# Patient Record
Sex: MALE | Race: WHITE | NOT HISPANIC OR LATINO | Employment: UNEMPLOYED | URBAN - METROPOLITAN AREA
[De-identification: names, ages, dates, MRNs, and addresses within clinical notes are randomized per-mention and may not be internally consistent; named-entity substitution may affect disease eponyms.]

---

## 2021-04-09 ENCOUNTER — HOSPITAL ENCOUNTER (OUTPATIENT)
Dept: VACCINE CLINIC | Facility: HOSPITAL | Age: 26
Discharge: HOME | End: 2021-04-09
Attending: INTERNAL MEDICINE

## 2022-06-21 PROCEDURE — U0004 COV-19 TEST NON-CDC HGH THRU: HCPCS | Performed by: NURSE PRACTITIONER

## 2022-09-30 ENCOUNTER — PATIENT ROUNDING (BHMG ONLY) (OUTPATIENT)
Dept: FAMILY MEDICINE CLINIC | Facility: CLINIC | Age: 27
End: 2022-09-30

## 2022-09-30 ENCOUNTER — OFFICE VISIT (OUTPATIENT)
Dept: FAMILY MEDICINE CLINIC | Facility: CLINIC | Age: 27
End: 2022-09-30

## 2022-09-30 VITALS
BODY MASS INDEX: 17.07 KG/M2 | SYSTOLIC BLOOD PRESSURE: 124 MMHG | DIASTOLIC BLOOD PRESSURE: 86 MMHG | HEIGHT: 74 IN | WEIGHT: 133 LBS | HEART RATE: 68 BPM | OXYGEN SATURATION: 98 %

## 2022-09-30 DIAGNOSIS — R52 BODY ACHES: ICD-10-CM

## 2022-09-30 DIAGNOSIS — F41.8 DEPRESSION WITH ANXIETY: ICD-10-CM

## 2022-09-30 DIAGNOSIS — J02.9 SORE THROAT: Primary | ICD-10-CM

## 2022-09-30 DIAGNOSIS — R09.82 PND (POST-NASAL DRIP): ICD-10-CM

## 2022-09-30 PROBLEM — F32.A DEPRESSION: Status: ACTIVE | Noted: 2022-09-30

## 2022-09-30 PROBLEM — F41.9 ANXIETY: Status: ACTIVE | Noted: 2022-09-30

## 2022-09-30 PROBLEM — F19.10 DRUG ABUSE: Status: ACTIVE | Noted: 2022-09-30

## 2022-09-30 PROBLEM — B19.20 HEPATITIS C: Status: ACTIVE | Noted: 2022-09-30

## 2022-09-30 LAB
EXPIRATION DATE: NORMAL
EXPIRATION DATE: NORMAL
FLUAV AG UPPER RESP QL IA.RAPID: NOT DETECTED
FLUBV AG UPPER RESP QL IA.RAPID: NOT DETECTED
INTERNAL CONTROL: NORMAL
INTERNAL CONTROL: NORMAL
Lab: NORMAL
Lab: NORMAL
S PYO AG THROAT QL: NEGATIVE
SARS-COV-2 AG UPPER RESP QL IA.RAPID: NOT DETECTED

## 2022-09-30 PROCEDURE — 99213 OFFICE O/P EST LOW 20 MIN: CPT | Performed by: NURSE PRACTITIONER

## 2022-09-30 PROCEDURE — 87880 STREP A ASSAY W/OPTIC: CPT | Performed by: NURSE PRACTITIONER

## 2022-09-30 PROCEDURE — 87428 SARSCOV & INF VIR A&B AG IA: CPT | Performed by: NURSE PRACTITIONER

## 2022-09-30 RX ORDER — BUPRENORPHINE 300 MG/1
SOLUTION SUBCUTANEOUS
COMMUNITY
Start: 2022-08-18

## 2022-09-30 RX ORDER — BUPROPION HYDROCHLORIDE 300 MG/1
300 TABLET ORAL DAILY
COMMUNITY

## 2022-09-30 RX ORDER — LORATADINE 10 MG/1
10 TABLET ORAL DAILY
Qty: 90 TABLET | Refills: 1 | Status: SHIPPED | OUTPATIENT
Start: 2022-09-30

## 2023-11-02 ENCOUNTER — LAB (OUTPATIENT)
Dept: LAB | Facility: HOSPITAL | Age: 28
End: 2023-11-02
Payer: COMMERCIAL

## 2023-11-02 ENCOUNTER — OFFICE VISIT (OUTPATIENT)
Dept: FAMILY MEDICINE CLINIC | Facility: CLINIC | Age: 28
End: 2023-11-02
Payer: COMMERCIAL

## 2023-11-02 VITALS
TEMPERATURE: 98.4 F | HEART RATE: 88 BPM | BODY MASS INDEX: 15.94 KG/M2 | SYSTOLIC BLOOD PRESSURE: 112 MMHG | WEIGHT: 124.2 LBS | DIASTOLIC BLOOD PRESSURE: 76 MMHG | OXYGEN SATURATION: 99 % | HEIGHT: 74 IN

## 2023-11-02 DIAGNOSIS — F19.10 DRUG ABUSE, IV: ICD-10-CM

## 2023-11-02 DIAGNOSIS — R10.811 RIGHT UPPER QUADRANT ABDOMINAL TENDERNESS WITHOUT REBOUND TENDERNESS: ICD-10-CM

## 2023-11-02 DIAGNOSIS — N48.9 LESION OF PENIS: ICD-10-CM

## 2023-11-02 DIAGNOSIS — Z01.84 IMMUNITY STATUS TESTING: ICD-10-CM

## 2023-11-02 DIAGNOSIS — R63.4 UNINTENDED WEIGHT LOSS: Primary | ICD-10-CM

## 2023-11-02 DIAGNOSIS — Z86.19 HISTORY OF HEPATITIS C: ICD-10-CM

## 2023-11-02 DIAGNOSIS — M25.50 MULTIPLE JOINT PAIN: ICD-10-CM

## 2023-11-02 DIAGNOSIS — Z00.00 HEALTHCARE MAINTENANCE: ICD-10-CM

## 2023-11-02 DIAGNOSIS — E55.9 VITAMIN D DEFICIENCY: ICD-10-CM

## 2023-11-02 LAB
25(OH)D3 SERPL-MCNC: 39.7 NG/ML (ref 30–100)
ALPHA-FETOPROTEIN: 3.61 NG/ML (ref 0–8.3)
BASOPHILS # BLD AUTO: 0.02 10*3/MM3 (ref 0–0.2)
BASOPHILS NFR BLD AUTO: 0.5 % (ref 0–1.5)
DEPRECATED RDW RBC AUTO: 40 FL (ref 37–54)
EOSINOPHIL # BLD AUTO: 0.22 10*3/MM3 (ref 0–0.4)
EOSINOPHIL NFR BLD AUTO: 5 % (ref 0.3–6.2)
ERYTHROCYTE [DISTWIDTH] IN BLOOD BY AUTOMATED COUNT: 12.3 % (ref 12.3–15.4)
HBV SURFACE AB SER RIA-ACNC: REACTIVE
HCT VFR BLD AUTO: 46.1 % (ref 37.5–51)
HGB BLD-MCNC: 15.8 G/DL (ref 13–17.7)
HIV 1+2 AB+HIV1 P24 AG SERPL QL IA: NORMAL
IMM GRANULOCYTES # BLD AUTO: 0.01 10*3/MM3 (ref 0–0.05)
IMM GRANULOCYTES NFR BLD AUTO: 0.2 % (ref 0–0.5)
LYMPHOCYTES # BLD AUTO: 1.37 10*3/MM3 (ref 0.7–3.1)
LYMPHOCYTES NFR BLD AUTO: 31.1 % (ref 19.6–45.3)
MCH RBC QN AUTO: 30.3 PG (ref 26.6–33)
MCHC RBC AUTO-ENTMCNC: 34.3 G/DL (ref 31.5–35.7)
MCV RBC AUTO: 88.3 FL (ref 79–97)
MONOCYTES # BLD AUTO: 0.4 10*3/MM3 (ref 0.1–0.9)
MONOCYTES NFR BLD AUTO: 9.1 % (ref 5–12)
NEUTROPHILS NFR BLD AUTO: 2.39 10*3/MM3 (ref 1.7–7)
NEUTROPHILS NFR BLD AUTO: 54.1 % (ref 42.7–76)
NRBC BLD AUTO-RTO: 0 /100 WBC (ref 0–0.2)
PLATELET # BLD AUTO: 212 10*3/MM3 (ref 140–450)
PMV BLD AUTO: 10.8 FL (ref 6–12)
RBC # BLD AUTO: 5.22 10*6/MM3 (ref 4.14–5.8)
WBC NRBC COR # BLD: 4.41 10*3/MM3 (ref 3.4–10.8)

## 2023-11-02 PROCEDURE — 86706 HEP B SURFACE ANTIBODY: CPT | Performed by: NURSE PRACTITIONER

## 2023-11-02 PROCEDURE — 86038 ANTINUCLEAR ANTIBODIES: CPT | Performed by: NURSE PRACTITIONER

## 2023-11-02 PROCEDURE — 86140 C-REACTIVE PROTEIN: CPT | Performed by: NURSE PRACTITIONER

## 2023-11-02 PROCEDURE — 80050 GENERAL HEALTH PANEL: CPT | Performed by: NURSE PRACTITIONER

## 2023-11-02 PROCEDURE — 80061 LIPID PANEL: CPT | Performed by: NURSE PRACTITIONER

## 2023-11-02 PROCEDURE — 99214 OFFICE O/P EST MOD 30 MIN: CPT | Performed by: NURSE PRACTITIONER

## 2023-11-02 PROCEDURE — G0432 EIA HIV-1/HIV-2 SCREEN: HCPCS | Performed by: NURSE PRACTITIONER

## 2023-11-02 PROCEDURE — 82105 ALPHA-FETOPROTEIN SERUM: CPT | Performed by: NURSE PRACTITIONER

## 2023-11-02 PROCEDURE — 1159F MED LIST DOCD IN RCRD: CPT | Performed by: NURSE PRACTITIONER

## 2023-11-02 PROCEDURE — 83036 HEMOGLOBIN GLYCOSYLATED A1C: CPT | Performed by: NURSE PRACTITIONER

## 2023-11-02 PROCEDURE — 1160F RVW MEDS BY RX/DR IN RCRD: CPT | Performed by: NURSE PRACTITIONER

## 2023-11-02 PROCEDURE — 86200 CCP ANTIBODY: CPT | Performed by: NURSE PRACTITIONER

## 2023-11-02 PROCEDURE — 87522 HEPATITIS C REVRS TRNSCRPJ: CPT | Performed by: NURSE PRACTITIONER

## 2023-11-02 PROCEDURE — 82306 VITAMIN D 25 HYDROXY: CPT | Performed by: NURSE PRACTITIONER

## 2023-11-02 PROCEDURE — 36415 COLL VENOUS BLD VENIPUNCTURE: CPT | Performed by: NURSE PRACTITIONER

## 2023-11-02 PROCEDURE — 84134 ASSAY OF PREALBUMIN: CPT | Performed by: NURSE PRACTITIONER

## 2023-11-02 RX ORDER — LAMOTRIGINE 100 MG/1
100 TABLET ORAL DAILY
COMMUNITY

## 2023-11-02 NOTE — PROGRESS NOTES
Subjective   Jessica Rao is a 28 y.o. male.   Chief Complaint   Patient presents with    Joint Pain    Weight Loss     unintended       Joint Pain  Associated symptoms include abdominal pain (occ right upper abdominal pain), arthralgias (hands, neck, knees, elbows) and neck pain. Pertinent negatives include no chest pain, chills, coughing, fatigue, fever, headaches, nausea, rash or vomiting.   Weight Loss  Associated symptoms include abdominal pain (occ right upper abdominal pain), arthralgias (hands, neck, knees, elbows) and neck pain. Pertinent negatives include no chest pain, chills, coughing, fatigue, fever, headaches, nausea, rash or vomiting.      Patient is here with complaint of ongoing unintended weight loss. Patient sees therapist at [facility], is on Lamictal; averages 2 meals a day; eats snacks, rarely has sweets, does not think he should be this thin based on what he eats. He does walk a lot for his job    Has been treated for Hep C, last drug use 15 months ago  The following portions of the patient's history were reviewed and updated as appropriate: allergies, current medications, past family history, past medical history, past social history, past surgical history, and problem list.    Review of Systems   Constitutional:  Positive for unexpected weight change (weight loss) and weight loss. Negative for chills, fatigue and fever.   Eyes:  Negative for photophobia, redness and visual disturbance.   Respiratory:  Negative for cough, shortness of breath and wheezing.    Cardiovascular:  Negative for chest pain, palpitations and leg swelling.   Gastrointestinal:  Positive for abdominal pain (occ right upper abdominal pain). Negative for blood in stool, constipation, diarrhea, nausea and vomiting.   Endocrine: Positive for cold intolerance. Negative for heat intolerance, polydipsia and polyuria.   Genitourinary:  Negative for difficulty urinating and dysuria.   Musculoskeletal:  Positive for arthralgias  "(hands, neck, knees, elbows) and neck pain.   Skin:  Negative for color change, pallor, rash and wound.   Neurological:  Positive for light-headedness (occ with standing). Negative for tremors and headaches.   Psychiatric/Behavioral:  Negative for dysphoric mood, self-injury, sleep disturbance and suicidal ideas. The patient is nervous/anxious (controlled).        Objective   Physical Exam  Vitals reviewed.   Constitutional:       Appearance: Normal appearance. He is underweight.   HENT:      Head: Normocephalic and atraumatic.   Cardiovascular:      Rate and Rhythm: Normal rate and regular rhythm.      Heart sounds: Normal heart sounds.   Pulmonary:      Effort: No respiratory distress.      Breath sounds: Normal breath sounds.   Abdominal:      General: Abdomen is flat.      Palpations: Abdomen is soft.      Tenderness: There is no abdominal tenderness.   Genitourinary:     Comments: Round 0.2 cm raised lesion on penis  Musculoskeletal:      Comments: Negative Marfan's wrist and thumb signs   Neurological:      Mental Status: He is alert and oriented to person, place, and time.   Psychiatric:         Mood and Affect: Mood normal.         Behavior: Behavior normal.         Thought Content: Thought content normal.         Judgment: Judgment normal.       /76 (BP Location: Right arm, Patient Position: Sitting, Cuff Size: Adult)   Pulse 88   Temp 98.4 °F (36.9 °C) (Oral)   Ht 188 cm (74.02\")   Wt 56.3 kg (124 lb 3.2 oz)   SpO2 99%   BMI 15.94 kg/m²     Assessment & Plan   Problems Addressed this Visit    None  Visit Diagnoses       Unintended weight loss    -  Primary    Relevant Orders    Hemoglobin A1c (Completed)    HIV-1 / O / 2 Ag / Antibody (Completed)    Prealbumin (Completed)    US Liver    Immunity status testing        Relevant Orders    Hepatitis B Surface Antibody (Completed)    Healthcare maintenance        Relevant Orders    CBC Auto Differential (Completed)    Comprehensive Metabolic Panel " (Completed)    Lipid Panel (Completed)    TSH Rfx On Abnormal To Free T4 (Completed)    Vitamin D deficiency        Relevant Orders    Vitamin D,25-Hydroxy (Completed)    History of hepatitis C        Relevant Orders    AFP Tumor Marker (Completed)    Hepatitis C RNA, Quantitative, PCR (graph) (Completed)    US Liver    Multiple joint pain        Relevant Orders    C-reactive protein (Completed)    Cyclic Citrul Peptide Antibody, IgG / IgA (Completed)    SYD by IFA, Reflex 9-biomarkers profile    Drug abuse, IV        Relevant Orders    HIV-1 / O / 2 Ag / Antibody (Completed)    Lesion of penis        Relevant Orders    Ambulatory Referral to Dermatology (Completed)    Right upper quadrant abdominal tenderness without rebound tenderness        Relevant Orders    US Liver          Diagnoses         Codes Comments    Unintended weight loss    -  Primary ICD-10-CM: R63.4  ICD-9-CM: 783.21     Immunity status testing     ICD-10-CM: Z01.84  ICD-9-CM: V72.61     Healthcare maintenance     ICD-10-CM: Z00.00  ICD-9-CM: V70.0     Vitamin D deficiency     ICD-10-CM: E55.9  ICD-9-CM: 268.9     History of hepatitis C     ICD-10-CM: Z86.19  ICD-9-CM: V12.09     Multiple joint pain     ICD-10-CM: M25.50  ICD-9-CM: 719.49     Drug abuse, IV     ICD-10-CM: F19.10  ICD-9-CM: 305.90     Lesion of penis     ICD-10-CM: N48.9  ICD-9-CM: 607.89     Right upper quadrant abdominal tenderness without rebound tenderness     ICD-10-CM: R10.811  ICD-9-CM: 789.61             Screening labs ordered to evaluate chronic conditions. I will contact patient regarding test results and provide instructions regarding any necessary changes in plan of care.  Referred to dermatology for evaluation of lesion on penis  Patient was encouraged to keep me informed of any acute changes, lack of improvement, or any new concerning symptoms.  High protein high calorie diet information provided

## 2023-11-03 LAB
ALBUMIN SERPL-MCNC: 5 G/DL (ref 3.5–5.2)
ALBUMIN/GLOB SERPL: 1.7 G/DL
ALP SERPL-CCNC: 90 U/L (ref 39–117)
ALT SERPL W P-5'-P-CCNC: 17 U/L (ref 1–41)
ANION GAP SERPL CALCULATED.3IONS-SCNC: 9.6 MMOL/L (ref 5–15)
AST SERPL-CCNC: 21 U/L (ref 1–40)
BILIRUB SERPL-MCNC: 0.5 MG/DL (ref 0–1.2)
BUN SERPL-MCNC: 11 MG/DL (ref 6–20)
BUN/CREAT SERPL: 11.8 (ref 7–25)
CALCIUM SPEC-SCNC: 9.7 MG/DL (ref 8.6–10.5)
CHLORIDE SERPL-SCNC: 101 MMOL/L (ref 98–107)
CHOLEST SERPL-MCNC: 170 MG/DL (ref 0–200)
CO2 SERPL-SCNC: 28.4 MMOL/L (ref 22–29)
CREAT SERPL-MCNC: 0.93 MG/DL (ref 0.76–1.27)
CRP SERPL-MCNC: <0.3 MG/DL (ref 0–0.5)
EGFRCR SERPLBLD CKD-EPI 2021: 114.7 ML/MIN/1.73
GLOBULIN UR ELPH-MCNC: 2.9 GM/DL
GLUCOSE SERPL-MCNC: 78 MG/DL (ref 65–99)
HBA1C MFR BLD: 5 % (ref 4.8–5.6)
HDLC SERPL-MCNC: 50 MG/DL (ref 40–60)
LDLC SERPL CALC-MCNC: 106 MG/DL (ref 0–100)
LDLC/HDLC SERPL: 2.1 {RATIO}
POTASSIUM SERPL-SCNC: 4.5 MMOL/L (ref 3.5–5.2)
PREALB SERPL-MCNC: 28.2 MG/DL (ref 20–40)
PROT SERPL-MCNC: 7.9 G/DL (ref 6–8.5)
SODIUM SERPL-SCNC: 139 MMOL/L (ref 136–145)
TRIGL SERPL-MCNC: 76 MG/DL (ref 0–150)
TSH SERPL DL<=0.05 MIU/L-ACNC: 2.82 UIU/ML (ref 0.27–4.2)
VLDLC SERPL-MCNC: 14 MG/DL (ref 5–40)

## 2023-11-04 LAB — CCP IGA+IGG SERPL IA-ACNC: 3 UNITS (ref 0–19)

## 2023-11-06 ENCOUNTER — PATIENT MESSAGE (OUTPATIENT)
Dept: FAMILY MEDICINE CLINIC | Facility: CLINIC | Age: 28
End: 2023-11-06
Payer: COMMERCIAL

## 2023-11-06 LAB
HCV RNA SERPL NAA+PROBE-ACNC: NORMAL IU/ML
TEST INFORMATION: NORMAL

## 2023-11-06 NOTE — TELEPHONE ENCOUNTER
From: Jessica Rao  To: Hafsa Person  Sent: 11/6/2023 10:03 AM EST  Subject: Referrals     Where did you send my referral for the urologist to?

## 2023-11-07 LAB
ANA SER QL IF: NEGATIVE
LABORATORY COMMENT REPORT: NORMAL

## 2023-11-30 ENCOUNTER — OFFICE VISIT (OUTPATIENT)
Dept: FAMILY MEDICINE CLINIC | Facility: CLINIC | Age: 28
End: 2023-11-30
Payer: COMMERCIAL

## 2023-11-30 VITALS
SYSTOLIC BLOOD PRESSURE: 118 MMHG | WEIGHT: 124.4 LBS | HEIGHT: 74 IN | HEART RATE: 80 BPM | OXYGEN SATURATION: 99 % | DIASTOLIC BLOOD PRESSURE: 80 MMHG | BODY MASS INDEX: 15.97 KG/M2

## 2023-11-30 DIAGNOSIS — R29.898 TALL STATURE: ICD-10-CM

## 2023-11-30 DIAGNOSIS — R68.89 THIN BUILD: ICD-10-CM

## 2023-11-30 DIAGNOSIS — Z00.00 ANNUAL PHYSICAL EXAM: Primary | ICD-10-CM

## 2023-11-30 DIAGNOSIS — R63.6 UNDERWEIGHT ON EXAMINATION: ICD-10-CM

## 2023-11-30 NOTE — PROGRESS NOTES
Patient Care Team:  Hafsa Person APRN as PCP - General (Nurse Practitioner)     Chief Complaint   Patient presents with    Annual Exam       Chief complaint: Patient is in today for a physical     Patient is a 28 y.o. male who presents for his yearly physical exam.     HPI   Here for physical,Still sees therapist at [facility]; Lamictal working well. Has been doing push ups for strengthening. 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 Tried wellbutrin to try to quit vaping, made him more anxious so did not continue  Has not gained any weight, did not see letter with labs  Scheduled for liver US, hx of Hep C, negative PCR  Review of Systems   Constitutional:  Negative for chills, fatigue, fever and unexpected weight change.   HENT:  Negative for congestion, ear pain, hearing loss and trouble swallowing.    Eyes:  Negative for photophobia, redness and visual disturbance.   Respiratory:  Negative for cough, shortness of breath and wheezing.         Sternal discomfort when vaping   Cardiovascular:  Negative for chest pain, palpitations and leg swelling.   Gastrointestinal:  Negative for abdominal pain, blood in stool, constipation, diarrhea, nausea and vomiting.   Endocrine: Positive for cold intolerance. Negative for heat intolerance, polydipsia and polyuria.   Genitourinary:  Negative for difficulty urinating and dysuria.   Musculoskeletal:  Positive for arthralgias (hands, neck, knees, elbows; worse in cold weather) and neck pain.   Skin:  Negative for color change, pallor, rash and wound.   Neurological:  Positive for light-headedness (occ with standing if hasn't eaten). Negative for dizziness, tremors and headaches.   Psychiatric/Behavioral:  Negative for dysphoric mood, self-injury, sleep disturbance and suicidal ideas. The patient is nervous/anxious (controlled).       History  Past Medical History:   Diagnosis Date    Anxiety     Depression     Drug abuse     Hepatitis C     treated 2022      Past  Surgical History:   Procedure Laterality Date    WISDOM TOOTH EXTRACTION        No Known Allergies   Family History   Problem Relation Age of Onset    Depression Mother     Anxiety disorder Mother     Hypertension Father     Carpal tunnel syndrome Father     COPD Father     Drug abuse Father     Arthritis Father     Lupus Father     Leukemia Paternal Grandmother     Diabetes Paternal Grandfather     Dementia Paternal Grandfather      Social History     Socioeconomic History    Marital status: Single   Tobacco Use    Smoking status: Former     Packs/day: 0.50     Years: 1.00     Additional pack years: 0.00     Total pack years: 0.50     Types: Cigarettes     Start date: 9/10/2007     Quit date: 2022     Years since quittin.3     Passive exposure: Never    Smokeless tobacco: Never   Vaping Use    Vaping Use: Every day    Substances: Nicotine    Devices: Disposable   Substance and Sexual Activity    Alcohol use: Not Currently     Comment: last drink around     Drug use: Not Currently     Types: Heroin, Cocaine(coke), Methamphetamines, Marijuana     Comment: last use 22    Sexual activity: Yes     Partners: Female        Current Outpatient Medications:     lamoTRIgine (LaMICtal) 100 MG tablet, Take 2 tablets by mouth Daily., Disp: , Rfl:     Immunizations   N/A   Prescribed/Refused   Date     Td/Tdap  (Booster Q 10 yrs)   []           Prescribed    []     Refused        []           Flu  (Yearly)   []        Prescribed    []     Refused        []           Pneumonia      []        Prescribed    []     Refused        []                 Hep B     []        Prescribed    []     Refused        []           Shingles  (Age 50 and older)   []        Prescribed    []     Refused        []           Immunization History   Administered Date(s) Administered    COVID-19 ([employer]) 2021    DTaP, Unspecified 2000    Hep B, Adolescent or Pediatric 2000    Hepatitis A 2018, 2019    IPV  04/20/2000    MCV4 Unspecified 05/04/2010    MMR 04/20/2000    Meningococcal MCV4P ([employer]) 05/04/2010    Tdap 05/04/2010, 01/25/2014     Health Maintenance   Topic Date Due    BMI FOLLOWUP  Never done    Hepatitis B (2 of 3 - 3-dose series) 05/18/2000    ANNUAL PHYSICAL  Never done    COVID-19 Vaccine (2 - 2023-24 season) 09/01/2023    INFLUENZA VACCINE  03/31/2024 (Originally 8/1/2023)    TDAP/TD VACCINES (3 - Td or Tdap) 01/25/2024    HEPATITIS C SCREENING  Completed    Pneumococcal Vaccine 0-64  Aged Out        Diabetes  [] Yes  [] No   N/A      Date     Eye Exam     []             []   Complete     []   Recommended Date:  Where:       Foot Exam     []         []   Complete          Obesity Counseling     []       []   Complete       Hemoglobin A1C   Date Value Ref Range Status   11/02/2023 5.00 4.80 - 5.60 % Final       Additional Testing      Date     Colorectal Screening       []   N/A   []   Complete    []   Ordered     Date:    Where:       Pap      []   N/A   []   Complete   []   OB/GYN Date:    Where:       Mammogram        []   N/A   []   Complete   []  OB/GYN   []   Ordered Date:    Where:     PSA  (Over age 50)    []   N/A   []   Complete   []   Ordered Date:    Where:     US Aorta  (For male smokers, age 65)     []   N/A   []   Complete   []   Ordered Date:    Where:     CT for Smoker  (Age 55-75, 30 pk yr)    []   N/A   []   Complete   []   Ordered Date:    Where:     Bone Density/DEXA      []   N/A   []   Complete   []   Ordered Date:    Follow-up:     Hep. C        []   N/A   []   Complete   []   Ordered Date:    Where:     Results for orders placed or performed in visit on 11/02/23   CBC Auto Differential    Specimen: Blood   Result Value Ref Range    WBC 4.41 3.40 - 10.80 10*3/mm3    RBC 5.22 4.14 - 5.80 10*6/mm3    Hemoglobin 15.8 13.0 - 17.7 g/dL    Hematocrit 46.1 37.5 - 51.0 %    MCV 88.3 79.0 - 97.0 fL    MCH 30.3 26.6 - 33.0 pg    MCHC 34.3 31.5 - 35.7 g/dL    RDW 12.3 12.3 - 15.4 %     RDW-SD 40.0 37.0 - 54.0 fl    MPV 10.8 6.0 - 12.0 fL    Platelets 212 140 - 450 10*3/mm3    Neutrophil % 54.1 42.7 - 76.0 %    Lymphocyte % 31.1 19.6 - 45.3 %    Monocyte % 9.1 5.0 - 12.0 %    Eosinophil % 5.0 0.3 - 6.2 %    Basophil % 0.5 0.0 - 1.5 %    Immature Grans % 0.2 0.0 - 0.5 %    Neutrophils, Absolute 2.39 1.70 - 7.00 10*3/mm3    Lymphocytes, Absolute 1.37 0.70 - 3.10 10*3/mm3    Monocytes, Absolute 0.40 0.10 - 0.90 10*3/mm3    Eosinophils, Absolute 0.22 0.00 - 0.40 10*3/mm3    Basophils, Absolute 0.02 0.00 - 0.20 10*3/mm3    Immature Grans, Absolute 0.01 0.00 - 0.05 10*3/mm3    nRBC 0.0 0.0 - 0.2 /100 WBC   Comprehensive Metabolic Panel    Specimen: Blood   Result Value Ref Range    Glucose 78 65 - 99 mg/dL    BUN 11 6 - 20 mg/dL    Creatinine 0.93 0.76 - 1.27 mg/dL    Sodium 139 136 - 145 mmol/L    Potassium 4.5 3.5 - 5.2 mmol/L    Chloride 101 98 - 107 mmol/L    CO2 28.4 22.0 - 29.0 mmol/L    Calcium 9.7 8.6 - 10.5 mg/dL    Total Protein 7.9 6.0 - 8.5 g/dL    Albumin 5.0 3.5 - 5.2 g/dL    ALT (SGPT) 17 1 - 41 U/L    AST (SGOT) 21 1 - 40 U/L    Alkaline Phosphatase 90 39 - 117 U/L    Total Bilirubin 0.5 0.0 - 1.2 mg/dL    Globulin 2.9 gm/dL    A/G Ratio 1.7 g/dL    BUN/Creatinine Ratio 11.8 7.0 - 25.0    Anion Gap 9.6 5.0 - 15.0 mmol/L    eGFR 114.7 >60.0 mL/min/1.73   Lipid Panel    Specimen: Blood   Result Value Ref Range    Total Cholesterol 170 0 - 200 mg/dL    Triglycerides 76 0 - 150 mg/dL    HDL Cholesterol 50 40 - 60 mg/dL    LDL Cholesterol  106 (H) 0 - 100 mg/dL    VLDL Cholesterol 14 5 - 40 mg/dL    LDL/HDL Ratio 2.10    Vitamin D,25-Hydroxy    Specimen: Blood   Result Value Ref Range    25 Hydroxy, Vitamin D 39.7 30.0 - 100.0 ng/ml   TSH Rfx On Abnormal To Free T4    Specimen: Blood   Result Value Ref Range    TSH 2.820 0.270 - 4.200 uIU/mL   Hepatitis B Surface Antibody    Specimen: Blood   Result Value Ref Range    Hep B S Ab Reactive (A) Non-Reactive   Hemoglobin A1c    Specimen: Blood  "  Result Value Ref Range    Hemoglobin A1C 5.00 4.80 - 5.60 %   AFP Tumor Marker    Specimen: Blood   Result Value Ref Range    ALPHA-FETOPROTEIN 3.61 0 - 8.3 ng/mL   C-reactive protein    Specimen: Blood   Result Value Ref Range    C-Reactive Protein <0.30 0.00 - 0.50 mg/dL   Cyclic Citrul Peptide Antibody, IgG / IgA    Specimen: Blood   Result Value Ref Range    CCP Antibodies IgG/IgA 3 0 - 19 units   SYD by IFA, Reflex 9-biomarkers profile    Specimen: Blood   Result Value Ref Range    SYD Negative     Please note Comment    HIV-1 / O / 2 Ag / Antibody    Specimen: Blood   Result Value Ref Range    HIV DUO Non-Reactive Non-Reactive   Prealbumin    Specimen: Blood   Result Value Ref Range    Prealbumin 28.2 20.0 - 40.0 mg/dL   Hepatitis C RNA, Quantitative, PCR (graph)    Specimen: Blood   Result Value Ref Range    Hepatitis C Quantitation HCV Not Detected IU/mL    Test Information Comment             /80   Pulse 80   Ht 188 cm (74.02\")   Wt 56.4 kg (124 lb 6.4 oz)   SpO2 99%   BMI 15.96 kg/m²       Physical Exam  Vitals and nursing note reviewed.   Constitutional:       General: He is not in acute distress.     Appearance: Normal appearance. He is well-developed and underweight. He is not diaphoretic.   HENT:      Head: Normocephalic and atraumatic.      Right Ear: External ear normal.      Left Ear: External ear normal.      Nose: Nose normal.   Eyes:      General: No scleral icterus.        Right eye: No discharge.         Left eye: No discharge.      Conjunctiva/sclera: Conjunctivae normal.      Pupils: Pupils are equal, round, and reactive to light.   Neck:      Thyroid: No thyromegaly.      Vascular: No carotid bruit or JVD (no bruits).      Trachea: No tracheal deviation.   Cardiovascular:      Rate and Rhythm: Normal rate and regular rhythm.      Heart sounds: No murmur heard.     No friction rub. No gallop.   Pulmonary:      Effort: Pulmonary effort is normal. No respiratory distress.      " Breath sounds: Normal breath sounds. No wheezing or rales.   Chest:      Chest wall: No tenderness.   Abdominal:      General: Bowel sounds are normal. There is no distension.      Palpations: Abdomen is soft. There is no mass.      Tenderness: There is no abdominal tenderness. There is no guarding or rebound.      Hernia: No hernia is present.   Genitourinary:     Comments: deferred  Musculoskeletal:         General: No tenderness or deformity.      Cervical back: Normal range of motion and neck supple.      Right lower leg: No edema.      Left lower leg: No edema.      Comments: Negative thumb and wrist signs for Marfans   Lymphadenopathy:      Cervical: No cervical adenopathy.   Skin:     General: Skin is warm and dry.      Coloration: Skin is not pale.      Findings: No erythema or rash.   Neurological:      Mental Status: He is alert and oriented to person, place, and time.      Motor: No abnormal muscle tone.      Deep Tendon Reflexes: Reflexes are normal and symmetric. Reflexes normal.   Psychiatric:         Behavior: Behavior normal.         Thought Content: Thought content normal.         Judgment: Judgment normal.             Counseling provided on diet and nutrition and weight management.    Diagnoses and all orders for this visit:    Annual physical exam    Underweight on examination  -     Adult Transthoracic Echo Complete W/ Cont if Necessary Per Protocol; Future    Thin build  -     Adult Transthoracic Echo Complete W/ Cont if Necessary Per Protocol; Future    Tall stature  -     Adult Transthoracic Echo Complete W/ Cont if Necessary Per Protocol; Future     Your blood cell counts, kidney (creatinine, GFR), liver (ALT, AST), thyroid (TSH), and total cholesterol are all within normal range.   The prealbumin test for nutritional status is within normal range,  negative for HIV and rheumatoid arthritis. The tumor marker AFP is normal, this should be checked periodically due to history of Hep C. You have  immunity to Hep B, antibody is reactive.  Keep a log of the foods and drinks you have each day so we can try to determine why you are not able to gain weight.  Nutrition and activity goals reviewed including: mainly water to drink, limit white flour/processed sugar, and processed foods, choose fresh fruits, vegetables, fish, lean meats,high fiber carbs, exercise  working toward 150 mins cardio per week, weight training 2x/week.   does not meet all criteria for Marfan's but does have thin build, echo ordered to evaluate   Hafsa MARTINE Person   11/30/2023   11:22 EST          Please note that portions of this document were completed with a voice recognition program.     At [facility], we believe that sharing information builds trust and better relationships. You are receiving this note because you are receiving care at [facility] or have recently visited. It is possible you will see health information before a provider has talked with you about it. This kind of information can be easy to misunderstand. To help you fully understand what it means for your health, we urge you to discuss this note with your provider.

## 2024-01-12 ENCOUNTER — TELEPHONE (OUTPATIENT)
Dept: FAMILY MEDICINE CLINIC | Facility: CLINIC | Age: 29
End: 2024-01-12

## 2024-01-12 NOTE — TELEPHONE ENCOUNTER
I reached out to patient and advised him that he needs to contact the financial department. He stated he would like to reschedule as he is still trying to figure out his insurance. I gave him their phone number to call back to discuss further.

## 2024-01-12 NOTE — TELEPHONE ENCOUNTER
Provider: ARCENIO RALPH    Caller: [facility]      Phone Number: [phone]     Reason for Call: [facility] IS CALLED AND STATES THEY CAN'T GET A WHOLE OF THE PATIENT. LOOKS AS IF HE DOES NOT HAVE ANY INSURANCE ON FILE. [facility] IS LOOKING TO GET A HOLD OF HIM TO PUT DOWN A SMALL PAYMENT TO BE ABLE TO DO THE PROCEDURE ON THE 01/17/2024. IF THEY ARE UNABLE TO GET A HOLD OF  PATIENT IS IT OK FOR THEM TO PUSH THAT PROCEDURE BACK TO TRY AND GET A HOLD OF PATIENT?

## 2024-01-17 ENCOUNTER — HOSPITAL ENCOUNTER (OUTPATIENT)
Dept: ULTRASOUND IMAGING | Facility: HOSPITAL | Age: 29
Discharge: HOME | End: 2024-01-17